# Patient Record
Sex: MALE | Race: OTHER | HISPANIC OR LATINO | Employment: UNEMPLOYED | URBAN - METROPOLITAN AREA
[De-identification: names, ages, dates, MRNs, and addresses within clinical notes are randomized per-mention and may not be internally consistent; named-entity substitution may affect disease eponyms.]

---

## 2019-07-11 ENCOUNTER — HOSPITAL ENCOUNTER (EMERGENCY)
Facility: HOSPITAL | Age: 34
Discharge: HOME | End: 2019-07-11
Attending: EMERGENCY MEDICINE | Admitting: EMERGENCY MEDICINE

## 2019-07-11 ENCOUNTER — APPOINTMENT (EMERGENCY)
Dept: RADIOLOGY | Facility: HOSPITAL | Age: 34
End: 2019-07-11

## 2019-07-11 VITALS
OXYGEN SATURATION: 97 % | WEIGHT: 236 LBS | BODY MASS INDEX: 31.28 KG/M2 | RESPIRATION RATE: 18 BRPM | DIASTOLIC BLOOD PRESSURE: 80 MMHG | HEART RATE: 81 BPM | TEMPERATURE: 98 F | SYSTOLIC BLOOD PRESSURE: 133 MMHG | HEIGHT: 73 IN

## 2019-07-11 DIAGNOSIS — R07.89 CHEST WALL PAIN: Primary | ICD-10-CM

## 2019-07-11 LAB
ALBUMIN SERPL BCP-MCNC: 3.9 G/DL (ref 3.5–5)
ALP SERPL-CCNC: 100 U/L (ref 46–116)
ALT SERPL W P-5'-P-CCNC: 44 U/L (ref 12–78)
ANION GAP SERPL CALCULATED.3IONS-SCNC: 8 MMOL/L (ref 4–13)
AST SERPL W P-5'-P-CCNC: 17 U/L (ref 5–45)
BASOPHILS # BLD AUTO: 0.06 THOUSANDS/ΜL (ref 0–0.1)
BASOPHILS NFR BLD AUTO: 1 % (ref 0–1)
BILIRUB SERPL-MCNC: 0.1 MG/DL (ref 0.2–1)
BUN SERPL-MCNC: 13 MG/DL (ref 5–25)
CALCIUM SERPL-MCNC: 9.1 MG/DL (ref 8.3–10.1)
CHLORIDE SERPL-SCNC: 102 MMOL/L (ref 100–108)
CO2 SERPL-SCNC: 32 MMOL/L (ref 21–32)
CREAT SERPL-MCNC: 1.09 MG/DL (ref 0.6–1.3)
EOSINOPHIL # BLD AUTO: 0.62 THOUSAND/ΜL (ref 0–0.61)
EOSINOPHIL NFR BLD AUTO: 6 % (ref 0–6)
ERYTHROCYTE [DISTWIDTH] IN BLOOD BY AUTOMATED COUNT: 13.6 % (ref 11.6–15.1)
GFR SERPL CREATININE-BSD FRML MDRD: 89 ML/MIN/1.73SQ M
GLUCOSE SERPL-MCNC: 107 MG/DL (ref 65–140)
HCT VFR BLD AUTO: 46.3 % (ref 36.5–49.3)
HGB BLD-MCNC: 15.3 G/DL (ref 12–17)
IMM GRANULOCYTES # BLD AUTO: 0.05 THOUSAND/UL (ref 0–0.2)
IMM GRANULOCYTES NFR BLD AUTO: 0 % (ref 0–2)
LYMPHOCYTES # BLD AUTO: 3.35 THOUSANDS/ΜL (ref 0.6–4.47)
LYMPHOCYTES NFR BLD AUTO: 30 % (ref 14–44)
MCH RBC QN AUTO: 28 PG (ref 26.8–34.3)
MCHC RBC AUTO-ENTMCNC: 33 G/DL (ref 31.4–37.4)
MCV RBC AUTO: 85 FL (ref 82–98)
MONOCYTES # BLD AUTO: 0.8 THOUSAND/ΜL (ref 0.17–1.22)
MONOCYTES NFR BLD AUTO: 7 % (ref 4–12)
NEUTROPHILS # BLD AUTO: 6.49 THOUSANDS/ΜL (ref 1.85–7.62)
NEUTS SEG NFR BLD AUTO: 56 % (ref 43–75)
NRBC BLD AUTO-RTO: 0 /100 WBCS
PLATELET # BLD AUTO: 295 THOUSANDS/UL (ref 149–390)
PMV BLD AUTO: 8.2 FL (ref 8.9–12.7)
POTASSIUM SERPL-SCNC: 3.8 MMOL/L (ref 3.5–5.3)
PROT SERPL-MCNC: 7.6 G/DL (ref 6.4–8.2)
RBC # BLD AUTO: 5.46 MILLION/UL (ref 3.88–5.62)
SODIUM SERPL-SCNC: 142 MMOL/L (ref 136–145)
TROPONIN I SERPL-MCNC: <0.02 NG/ML
WBC # BLD AUTO: 11.37 THOUSAND/UL (ref 4.31–10.16)

## 2019-07-11 PROCEDURE — 93005 ELECTROCARDIOGRAM TRACING: CPT

## 2019-07-11 PROCEDURE — 99284 EMERGENCY DEPT VISIT MOD MDM: CPT | Performed by: EMERGENCY MEDICINE

## 2019-07-11 PROCEDURE — 96374 THER/PROPH/DIAG INJ IV PUSH: CPT

## 2019-07-11 PROCEDURE — 71046 X-RAY EXAM CHEST 2 VIEWS: CPT

## 2019-07-11 PROCEDURE — 80053 COMPREHEN METABOLIC PANEL: CPT | Performed by: EMERGENCY MEDICINE

## 2019-07-11 PROCEDURE — 85025 COMPLETE CBC W/AUTO DIFF WBC: CPT | Performed by: EMERGENCY MEDICINE

## 2019-07-11 PROCEDURE — 99285 EMERGENCY DEPT VISIT HI MDM: CPT

## 2019-07-11 PROCEDURE — 36415 COLL VENOUS BLD VENIPUNCTURE: CPT

## 2019-07-11 PROCEDURE — 84484 ASSAY OF TROPONIN QUANT: CPT | Performed by: EMERGENCY MEDICINE

## 2019-07-11 RX ORDER — CYCLOBENZAPRINE HCL 10 MG
10 TABLET ORAL ONCE
Status: COMPLETED | OUTPATIENT
Start: 2019-07-11 | End: 2019-07-11

## 2019-07-11 RX ORDER — KETOROLAC TROMETHAMINE 30 MG/ML
15 INJECTION, SOLUTION INTRAMUSCULAR; INTRAVENOUS ONCE
Status: COMPLETED | OUTPATIENT
Start: 2019-07-11 | End: 2019-07-11

## 2019-07-11 RX ORDER — LIDOCAINE 50 MG/G
1 PATCH TOPICAL ONCE
Status: DISCONTINUED | OUTPATIENT
Start: 2019-07-11 | End: 2019-07-12 | Stop reason: HOSPADM

## 2019-07-11 RX ORDER — CYCLOBENZAPRINE HCL 10 MG
10 TABLET ORAL 2 TIMES DAILY PRN
Qty: 20 TABLET | Refills: 0 | Status: SHIPPED | OUTPATIENT
Start: 2019-07-11

## 2019-07-11 RX ORDER — NAPROXEN 500 MG/1
500 TABLET ORAL 2 TIMES DAILY PRN
Qty: 14 TABLET | Refills: 0 | Status: SHIPPED | OUTPATIENT
Start: 2019-07-11 | End: 2019-07-18

## 2019-07-11 RX ADMIN — CYCLOBENZAPRINE HYDROCHLORIDE 10 MG: 10 TABLET, FILM COATED ORAL at 21:43

## 2019-07-11 RX ADMIN — KETOROLAC TROMETHAMINE 15 MG: 30 INJECTION, SOLUTION INTRAMUSCULAR at 21:41

## 2019-07-11 RX ADMIN — LIDOCAINE 1 PATCH: 50 PATCH TOPICAL at 21:43

## 2019-07-12 LAB
ATRIAL RATE: 88 BPM
P AXIS: 62 DEGREES
PR INTERVAL: 108 MS
QRS AXIS: 57 DEGREES
QRSD INTERVAL: 88 MS
QT INTERVAL: 348 MS
QTC INTERVAL: 421 MS
T WAVE AXIS: -4 DEGREES
VENTRICULAR RATE: 88 BPM

## 2019-07-12 PROCEDURE — 93010 ELECTROCARDIOGRAM REPORT: CPT | Performed by: INTERNAL MEDICINE

## 2019-07-12 NOTE — ED PROVIDER NOTES
History  Chief Complaint   Patient presents with    Chest Pain     right sided pain with breathing; symptom onset last night;      HPI  28-year-old male, current tobacco smoker, presents to the emergency department with right-sided chest pain  Patient states that he has had worsening right-sided chest pain since early yesterday morning after sleeping on his right side in a twin sized bed with his significant other  Pain been exacerbated by palpation of his right chest wall as well as by a deep inspiration, coughing, sneezing, and movement of his right arm  He has not taken any medication for pain prior to evaluation in the emergency department  He denies having had similar pain in the past   He denies any recent chest wall trauma, heavy lifting, or overuse  On review of systems, he denies recent fevers, chills, abdominal pain, nausea, vomiting, weakness, numbness, paresthesias, lower extremity pain/swelling, or complaints other than stated above  He denies any known family history of VTE and denies any known personal history or risk factors of VTE  None       History reviewed  No pertinent past medical history  History reviewed  No pertinent surgical history  History reviewed  No pertinent family history  I have reviewed and agree with the history as documented  Social History     Tobacco Use    Smoking status: Current Every Day Smoker     Packs/day: 1 00     Types: Cigarettes   Substance Use Topics    Alcohol use: Not Currently    Drug use: Not on file        Review of Systems   Constitutional: Negative for chills and fever  Respiratory: Negative for shortness of breath  Cardiovascular: Positive for chest pain  Negative for leg swelling  Gastrointestinal: Negative for abdominal pain, nausea and vomiting  Musculoskeletal: Negative for arthralgias and joint swelling  Skin: Negative for rash and wound  Allergic/Immunologic: Negative for immunocompromised state     Neurological: Negative for headaches  Psychiatric/Behavioral: The patient is not nervous/anxious  All other systems reviewed and are negative  Physical Exam  Physical Exam   Constitutional: He is oriented to person, place, and time  He appears well-nourished  No distress  HENT:   Head: Normocephalic and atraumatic  Eyes: EOM are normal    Neck: Normal range of motion  Neck supple  Cardiovascular: Normal rate and regular rhythm  Pulmonary/Chest: Effort normal and breath sounds normal  No respiratory distress  He exhibits tenderness  Abdominal: Soft  He exhibits no distension  There is no tenderness  Musculoskeletal: Normal range of motion  Neurological: He is alert and oriented to person, place, and time  Skin: Skin is warm and dry  He is not diaphoretic  Psychiatric: He has a normal mood and affect  His behavior is normal    Nursing note and vitals reviewed        Vital Signs  ED Triage Vitals   Temperature Pulse Respirations Blood Pressure SpO2   07/11/19 2145 07/11/19 2027 07/11/19 2027 07/11/19 2027 07/11/19 2027   98 °F (36 7 °C) 90 18 138/90 99 %      Temp Source Heart Rate Source Patient Position - Orthostatic VS BP Location FiO2 (%)   07/11/19 2145 07/11/19 2027 07/11/19 2027 07/11/19 2027 --   Oral Monitor Sitting Left arm       Pain Score       07/11/19 2027       Worst Possible Pain           Vitals:    07/11/19 2027 07/11/19 2145 07/11/19 2200   BP: 138/90 126/85 133/80   Pulse: 90 82 81   Patient Position - Orthostatic VS: Sitting           Visual Acuity      ED Medications  Medications   ketorolac (TORADOL) injection 15 mg (15 mg Intravenous Given 7/11/19 2141)   cyclobenzaprine (FLEXERIL) tablet 10 mg (10 mg Oral Given 7/11/19 2143)       Diagnostic Studies  Results Reviewed     Procedure Component Value Units Date/Time    Troponin I [[id number]]  (Normal) Collected:  07/11/19 2039    Lab Status:  Final result Specimen:  Blood from Arm, Right Updated:  07/11/19 2102     Troponin I <0 02 ng/mL     Comprehensive metabolic panel [[id number]]  (Abnormal) Collected:  07/11/19 2039    Lab Status:  Final result Specimen:  Blood from Arm, Right Updated:  07/11/19 2100     Sodium 142 mmol/L      Potassium 3 8 mmol/L      Chloride 102 mmol/L      CO2 32 mmol/L      ANION GAP 8 mmol/L      BUN 13 mg/dL      Creatinine 1 09 mg/dL      Glucose 107 mg/dL      Calcium 9 1 mg/dL      AST 17 U/L      ALT 44 U/L      Alkaline Phosphatase 100 U/L      Total Protein 7 6 g/dL      Albumin 3 9 g/dL      Total Bilirubin 0 10 mg/dL      eGFR 89 ml/min/1 73sq m     Narrative:       [place] guidelines for Chronic Kidney Disease (CKD):     Stage 1 with normal or high GFR (GFR > 90 mL/min/1 73 square meters)    Stage 2 Mild CKD (GFR = 60-89 mL/min/1 73 square meters)    Stage 3A Moderate CKD (GFR = 45-59 mL/min/1 73 square meters)    Stage 3B Moderate CKD (GFR = 30-44 mL/min/1 73 square meters)    Stage 4 Severe CKD (GFR = 15-29 mL/min/1 73 square meters)    Stage 5 End Stage CKD (GFR <15 mL/min/1 73 square meters)  Note: GFR calculation is accurate only with a steady state creatinine    CBC and differential [[id number]]  (Abnormal) Collected:  07/11/19 2039    Lab Status:  Final result Specimen:  Blood from Arm, Right Updated:  07/11/19 2045     WBC 11 37 Thousand/uL      RBC 5 46 Million/uL      Hemoglobin 15 3 g/dL      Hematocrit 46 3 %      MCV 85 fL      MCH 28 0 pg      MCHC 33 0 g/dL      RDW 13 6 %      MPV 8 2 fL      Platelets 525 Thousands/uL      nRBC 0 /100 WBCs      Neutrophils Relative 56 %      Immat GRANS % 0 %      Lymphocytes Relative 30 %      Monocytes Relative 7 %      Eosinophils Relative 6 %      Basophils Relative 1 %      Neutrophils Absolute 6 49 Thousands/µL      Immature Grans Absolute 0 05 Thousand/uL      Lymphocytes Absolute 3 35 Thousands/µL      Monocytes Absolute 0 80 Thousand/µL      Eosinophils Absolute 0 62 Thousand/µL      Basophils Absolute 0 06 Thousands/µL                  XR chest 2 views   ED Interpretation by Juice Ni MD (07/11 2217)   Poor inspiratory effort  No acute cardiopulmonary disease                    Procedures  Procedures       ED Course  ED Course as of Jul 12 0728 Thu Jul 11, 2019 2202 EKG: NSR @ 88 BPM, normal axis, normal intervals, nonspecific T wave abnormality, no prior for comparison            HEART Risk Score      Most Recent Value   History  0 Filed at: 07/11/2019 2203   ECG  0 Filed at: 07/11/2019 2203   Age  0 Filed at: 07/11/2019 2203   Risk Factors  1 Filed at: 07/11/2019 2203   Troponin  0 Filed at: 07/11/2019 2203   Heart Score Risk Calculator   History  0 Filed at: 07/11/2019 2203   ECG  0 Filed at: 07/11/2019 2203   Age  0 Filed at: 07/11/2019 2203   Risk Factors  1 Filed at: 07/11/2019 2203   Troponin  0 Filed at: 07/11/2019 2203   HEART Score  1 Filed at: 07/11/2019 2203   HEART Score  1 Filed at: 07/11/2019 2203            PERC Rule for PE      Most Recent Value   PERC Rule for PE   Age >=50  0 Filed at: 07/11/2019 2200   HR >=100  0 Filed at: 07/11/2019 2200   O2 Sat on room air < 95%  0 Filed at: 07/11/2019 2200   History of PE or DVT  0 Filed at: 07/11/2019 2200   Recent trauma or surgery  0 Filed at: 07/11/2019 2200   Hemoptysis  0 Filed at: 07/11/2019 2200   Exogenous estrogen  0 Filed at: 07/11/2019 2200   Unilateral leg swelling  0 Filed at: 07/11/2019 2200   PERC Rule for PE Results  0 Filed at: 07/11/2019 2200                Wells' Criteria for PE      Most Recent Value   Wells' Criteria for PE   Clinical signs and symptoms of DVT  0 Filed at: 07/11/2019 2200   PE is primary diagnosis or equally likely  0 Filed at: 07/11/2019 2200   HR >100  0 Filed at: 07/11/2019 2200   Immobilization at least 3 days or Surgery in the previous 4 weeks  0 Filed at: 07/11/2019 2200   Previous, objectively diagnosed PE or DVT  0 Filed at: 07/11/2019 2200   Hemoptysis  0 Filed at: 07/11/2019 2200   Malignancy with treatment within 6 months or palliative  0 Filed at: 07/11/2019 2200   Wells' Criteria Total  0 Filed at: 07/11/2019 2200            MDM    Disposition  Final diagnoses:   Chest wall pain     Time reflects when diagnosis was documented in both MDM as applicable and the Disposition within this note     Time User Action Codes Description Comment    7/11/2019 10:03 PM Norma Hawleyer Add [R07 89] Chest wall pain       ED Disposition     ED Disposition Condition Date/Time Comment    Discharge Stable Thu Jul 11, 2019 10:03 PM Atul Acosta discharge to home/self care  Follow-up Information     Follow up With Specialties Details Why Contact Info    [address] Internal Medicine, Nurse Practitioner Schedule an appointment as soon as possible for a visit  As needed to set up PCP Juli 4 1  [address] [place] [id number]            Discharge Medication List as of 7/11/2019 10:06 PM      START taking these medications    Details   cyclobenzaprine (FLEXERIL) 10 mg tablet Take 1 tablet (10 mg total) by mouth 2 (two) times a day as needed for muscle spasms, Starting Thu 7/11/2019, Print      naproxen (NAPROSYN) 500 mg tablet Take 1 tablet (500 mg total) by mouth 2 (two) times a day as needed for moderate pain for up to 7 days, Starting Thu 7/11/2019, Until Thu 7/18/2019, Print           No discharge procedures on file      ED Provider  Electronically Signed by           Bernard Keller MD  07/12/19 7364